# Patient Record
Sex: FEMALE | Race: OTHER | NOT HISPANIC OR LATINO | URBAN - METROPOLITAN AREA
[De-identification: names, ages, dates, MRNs, and addresses within clinical notes are randomized per-mention and may not be internally consistent; named-entity substitution may affect disease eponyms.]

---

## 2023-08-29 ENCOUNTER — EMERGENCY (EMERGENCY)
Facility: HOSPITAL | Age: 23
LOS: 1 days | Discharge: HOME | End: 2023-08-29
Admitting: EMERGENCY MEDICINE
Payer: MEDICAID

## 2023-08-29 VITALS
TEMPERATURE: 98 F | SYSTOLIC BLOOD PRESSURE: 108 MMHG | RESPIRATION RATE: 16 BRPM | DIASTOLIC BLOOD PRESSURE: 83 MMHG | OXYGEN SATURATION: 100 % | HEART RATE: 91 BPM

## 2023-08-29 LAB
BASOPHILS # BLD AUTO: 0.05 K/UL — SIGNIFICANT CHANGE UP (ref 0–0.2)
BASOPHILS NFR BLD AUTO: 0.4 % — SIGNIFICANT CHANGE UP (ref 0–2)
EOSINOPHIL # BLD AUTO: 0.47 K/UL — SIGNIFICANT CHANGE UP (ref 0–0.5)
EOSINOPHIL NFR BLD AUTO: 3.7 % — SIGNIFICANT CHANGE UP (ref 0–6)
HCT VFR BLD CALC: 34.4 % — LOW (ref 34.5–45)
HGB BLD-MCNC: 11.4 G/DL — LOW (ref 11.5–15.5)
IANC: 7.43 K/UL — HIGH (ref 1.8–7.4)
IMM GRANULOCYTES NFR BLD AUTO: 0.2 % — SIGNIFICANT CHANGE UP (ref 0–0.9)
LYMPHOCYTES # BLD AUTO: 32.4 % — SIGNIFICANT CHANGE UP (ref 13–44)
LYMPHOCYTES # BLD AUTO: 4.15 K/UL — HIGH (ref 1–3.3)
MCHC RBC-ENTMCNC: 29.5 PG — SIGNIFICANT CHANGE UP (ref 27–34)
MCHC RBC-ENTMCNC: 33.1 GM/DL — SIGNIFICANT CHANGE UP (ref 32–36)
MCV RBC AUTO: 88.9 FL — SIGNIFICANT CHANGE UP (ref 80–100)
MONOCYTES # BLD AUTO: 0.66 K/UL — SIGNIFICANT CHANGE UP (ref 0–0.9)
MONOCYTES NFR BLD AUTO: 5.2 % — SIGNIFICANT CHANGE UP (ref 2–14)
NEUTROPHILS # BLD AUTO: 7.43 K/UL — HIGH (ref 1.8–7.4)
NEUTROPHILS NFR BLD AUTO: 58.1 % — SIGNIFICANT CHANGE UP (ref 43–77)
NRBC # BLD: 0 /100 WBCS — SIGNIFICANT CHANGE UP (ref 0–0)
NRBC # FLD: 0 K/UL — SIGNIFICANT CHANGE UP (ref 0–0)
PLATELET # BLD AUTO: 328 K/UL — SIGNIFICANT CHANGE UP (ref 150–400)
RBC # BLD: 3.87 M/UL — SIGNIFICANT CHANGE UP (ref 3.8–5.2)
RBC # FLD: 12.7 % — SIGNIFICANT CHANGE UP (ref 10.3–14.5)
WBC # BLD: 12.79 K/UL — HIGH (ref 3.8–10.5)
WBC # FLD AUTO: 12.79 K/UL — HIGH (ref 3.8–10.5)

## 2023-08-29 PROCEDURE — 99284 EMERGENCY DEPT VISIT MOD MDM: CPT

## 2023-08-29 PROCEDURE — 76830 TRANSVAGINAL US NON-OB: CPT | Mod: 26

## 2023-08-29 NOTE — ED ADULT TRIAGE NOTE - CHIEF COMPLAINT QUOTE
Pt c/o increase vaginal bleeding x2 months s/p taking plan B pill on July 9th. Patient also took another plan B pill on July 23rd. Now endorsing another heavy period. Endorsing fatigue. Pt denies blood clots, chest pain, sob, dizziness, n/v, fevers and chills. Pt well appearing.

## 2023-08-29 NOTE — ED PROVIDER NOTE - CROS ED GU ALL NEG
Assessment  Septic Shock from intra abdominal cause, wit persistent lactic acidosis Mesenteric Ischemia possible and CT showing duodenal perforation with GNR Pseudomonas Bacteremia     with multi organ Dysfunction  Upper GI bleed suspected s/p Code Fusion in ED, but no gross bleeding noted doubt as cause of shock  Lactic acidosis    Leukopenia    Thrombocytopenia     Transaminitis   MAYELIN suspect normal baseline   Underlying Afib on Eliquis  Underlying bipolar disorder, h/o skin cancer, 
- - -

## 2023-08-29 NOTE — ED PROVIDER NOTE - PROGRESS NOTE DETAILS
MICHAEL Hobson: Pt signed out to MICHAEL Villalobos pending lab and US results. MICHAEL Villalobos: Hgb 11.4, tvus negative. Toradol given for pain here. Will dc w/ outpt gyn follow up

## 2023-08-29 NOTE — ED PROVIDER NOTE - ABDOMINAL EXAM
Ambulatory Surgery/Procedure Discharge Note    Vitals:    11/09/22 1700   BP: (!) 127/54   Pulse: 78   Resp: 16   Temp:    SpO2: 91%   Patient meets criteria for discharge per Adeola score    In: 180 [P.O.:180]  Out: 50     Restroom use offered before discharge. Yes    Pain assessment:  present - adequately treated  Pain Level: 3    Pt and family states \"ready to go home\". Pt alert and oriented x4. IV removed. Denies N/V or pain. Left leg ACE bandage dressing-C,D,I. Discharge instructions given to pt and  with pt permission. Pt and  verbalized understanding of all instructions. Left with all belongings, crutches, and discharge instructions. Patient's  picked up prescriptions from [facility] outpatient pharmacy. Patient discharged to home/self care. Patient discharged via wheel chair by transporter to waiting family.        11/9/2022 5:30 PM soft/tender...

## 2023-08-29 NOTE — ED ADULT NURSE NOTE - NSFALLUNIVINTERV_ED_ALL_ED
Bed/Stretcher in lowest position, wheels locked, appropriate side rails in place/Call bell, personal items and telephone in reach/Instruct patient to call for assistance before getting out of bed/chair/stretcher/Non-slip footwear applied when patient is off stretcher/[place] to call system/Physically safe environment - no spills, clutter or unnecessary equipment/Purposeful proactive rounding/Room/bathroom lighting operational, light cord in reach

## 2023-08-29 NOTE — ED PROVIDER NOTE - OBJECTIVE STATEMENT
23yF w/no stated pmhx presenting to the ED with abnormal vaginal bleeding and pelvic pain. Pt states on 7/09 she took Plan B and her period began that day (was due for her menses), she reports vaginal bleeding from 7/09-7/22, she then took Plan B a second time on 7/23 following by bleeding for 1 week. She then had her period 8/10-8/15 and now has been having vaginal bleeding since 8/26. pt reports associated lower pelvic pain described as achey. Pt states she saw her PMD in July and had labwork performed with reported normal results, was recommended to have a pelvic US but has not had it completed yet. pt does not see an gynecologist. She states in July she was also treated for a UTI. Denies fever/chills, nausea, vomiting, diarrhea, dysuria, urinary frequency, back pain, cp, sob, palpitations, dizziness, weakness or any other concerns.

## 2023-08-29 NOTE — ED ADULT NURSE NOTE - OBJECTIVE STATEMENT
pt received in intake rm 13. pt is AAOX4 and ambulatory. pt c/o vaginal bleeding for a few days. pt LMP was beginning of August. pt reports having normal menstrual period. pt denies CP, SOB, N/V/D, dizziness and changes in vision. pt reports having taken plan B twice. pt endorsing cramping and pelvic pain. pt RR are even and unlabored. pt has 20g IV in RAC, labs drawn and sent and pt pending US. ongoing eval in progress.

## 2023-08-29 NOTE — ED PROVIDER NOTE - PATIENT PORTAL LINK FT
You can access the FollowMyHealth Patient Portal offered by [facility] by registering at the following website: [email or web address]. By joining [employer]’s FollowMyHealth portal, you will also be able to view your health information using other applications (apps) compatible with our system.

## 2023-08-29 NOTE — ED PROVIDER NOTE - CLINICAL SUMMARY MEDICAL DECISION MAKING FREE TEXT BOX
23yF w/no stated pmhx presenting to the ED with abnormal vaginal bleeding and pelvic pain. Pt states on 7/09 she took Plan B and her period began that day (was due for her menses), she reports vaginal bleeding from 7/09-7/22, she then took Plan B a second time on 7/23 following by bleeding for 1 week. She then had her period 8/10-8/15 and now has been having vaginal bleeding since 8/26. pt reports associated lower pelvic pain described as achey. Pt states she saw her PMD in July and had labwork performed with reported normal results, was recommended to have a pelvic US but has not had it completed yet. pt does not see an gynecologist. She states in July she was also treated for a UTI. On exam pt is well appearing, vitals within normal, mild suprapubic tenderness, no CMT, no adnexal tenderness. Plan: cbc/cmp, ua/ucx, ucg, TVUS to r/o hemorrhagic cyst/fibroid (pt does not have a gyn to follow up with).

## 2023-08-29 NOTE — ED PROVIDER NOTE - NSFOLLOWUPINSTRUCTIONS_ED_ALL_ED_FT
Follow up with a GYN within 1 week, referral list attached please call to make an appointment  Take Ibuprofen 600mg every 6-8 hours as needed for pain or cramping, take with food  -You can also take Tylenol 650mg every 6 hours for pain  Return to the ER with any worsening or concerning symptoms, increased pain, weakness, feeling faint, vomiting or any other concerns. Follow up with a GYN within 1 week, call [phone] to make an appointment with our clinic.   Take Ibuprofen 600mg every 6-8 hours as needed for pain or cramping, take with food  -You can also take Tylenol 650mg every 6 hours for pain  Return to the ER with any worsening or concerning symptoms, increased pain, weakness, feeling faint, vomiting or any other concerns.

## 2023-08-30 LAB
ALBUMIN SERPL ELPH-MCNC: 4.5 G/DL — SIGNIFICANT CHANGE UP (ref 3.3–5)
ALP SERPL-CCNC: 47 U/L — SIGNIFICANT CHANGE UP (ref 40–120)
ALT FLD-CCNC: 16 U/L — SIGNIFICANT CHANGE UP (ref 4–33)
ANION GAP SERPL CALC-SCNC: 15 MMOL/L — HIGH (ref 7–14)
APPEARANCE UR: CLEAR — SIGNIFICANT CHANGE UP
AST SERPL-CCNC: 24 U/L — SIGNIFICANT CHANGE UP (ref 4–32)
BILIRUB SERPL-MCNC: <0.2 MG/DL — SIGNIFICANT CHANGE UP (ref 0.2–1.2)
BILIRUB UR-MCNC: NEGATIVE — SIGNIFICANT CHANGE UP
BUN SERPL-MCNC: 15 MG/DL — SIGNIFICANT CHANGE UP (ref 7–23)
CALCIUM SERPL-MCNC: 9.1 MG/DL — SIGNIFICANT CHANGE UP (ref 8.4–10.5)
CHLORIDE SERPL-SCNC: 102 MMOL/L — SIGNIFICANT CHANGE UP (ref 98–107)
CO2 SERPL-SCNC: 24 MMOL/L — SIGNIFICANT CHANGE UP (ref 22–31)
COLOR SPEC: YELLOW — SIGNIFICANT CHANGE UP
CREAT SERPL-MCNC: 0.65 MG/DL — SIGNIFICANT CHANGE UP (ref 0.5–1.3)
DIFF PNL FLD: ABNORMAL
EGFR: 128 ML/MIN/1.73M2 — SIGNIFICANT CHANGE UP
GLUCOSE SERPL-MCNC: 82 MG/DL — SIGNIFICANT CHANGE UP (ref 70–99)
GLUCOSE UR QL: NEGATIVE MG/DL — SIGNIFICANT CHANGE UP
KETONES UR-MCNC: NEGATIVE MG/DL — SIGNIFICANT CHANGE UP
LEUKOCYTE ESTERASE UR-ACNC: NEGATIVE — SIGNIFICANT CHANGE UP
NITRITE UR-MCNC: NEGATIVE — SIGNIFICANT CHANGE UP
PH UR: 6.5 — SIGNIFICANT CHANGE UP (ref 5–8)
POTASSIUM SERPL-MCNC: 3.9 MMOL/L — SIGNIFICANT CHANGE UP (ref 3.5–5.3)
POTASSIUM SERPL-SCNC: 3.9 MMOL/L — SIGNIFICANT CHANGE UP (ref 3.5–5.3)
PROT SERPL-MCNC: 7.9 G/DL — SIGNIFICANT CHANGE UP (ref 6–8.3)
PROT UR-MCNC: NEGATIVE MG/DL — SIGNIFICANT CHANGE UP
SODIUM SERPL-SCNC: 141 MMOL/L — SIGNIFICANT CHANGE UP (ref 135–145)
SP GR SPEC: 1.01 — SIGNIFICANT CHANGE UP (ref 1–1.03)
UROBILINOGEN FLD QL: 1 MG/DL — SIGNIFICANT CHANGE UP (ref 0.2–1)

## 2023-08-30 RX ORDER — KETOROLAC TROMETHAMINE 30 MG/ML
15 SYRINGE (ML) INJECTION ONCE
Refills: 0 | Status: DISCONTINUED | OUTPATIENT
Start: 2023-08-30 | End: 2023-08-30

## 2023-08-30 RX ADMIN — Medication 15 MILLIGRAM(S): at 01:30

## 2023-08-31 LAB
CULTURE RESULTS: SIGNIFICANT CHANGE UP
SPECIMEN SOURCE: SIGNIFICANT CHANGE UP

## 2024-03-20 ENCOUNTER — EMERGENCY (EMERGENCY)
Facility: HOSPITAL | Age: 24
LOS: 1 days | Discharge: LEFT BEFORE DISCHARGE | End: 2024-03-20
Admitting: EMERGENCY MEDICINE
Payer: MEDICAID

## 2024-03-20 VITALS
OXYGEN SATURATION: 100 % | HEART RATE: 97 BPM | RESPIRATION RATE: 18 BRPM | TEMPERATURE: 98 F | SYSTOLIC BLOOD PRESSURE: 97 MMHG | DIASTOLIC BLOOD PRESSURE: 58 MMHG

## 2024-03-20 PROCEDURE — L9992: CPT

## 2024-03-20 NOTE — ED ADULT TRIAGE NOTE - CHIEF COMPLAINT QUOTE
Pt. c/o blood when wiping herself but unsure where it was coming from. States she did have a bowel movement at the time. Pt. not currently on her period but scheduled to get it in the next 4-5 days. Also c/o headache, abdominal cramps and chills.

## 2024-03-21 PROBLEM — Z78.9 OTHER SPECIFIED HEALTH STATUS: Chronic | Status: ACTIVE | Noted: 2023-08-29

## 2024-03-23 ENCOUNTER — EMERGENCY (EMERGENCY)
Facility: HOSPITAL | Age: 24
LOS: 1 days | Discharge: HOME | End: 2024-03-23
Attending: EMERGENCY MEDICINE
Payer: MEDICAID

## 2024-03-23 VITALS
SYSTOLIC BLOOD PRESSURE: 80 MMHG | RESPIRATION RATE: 22 BRPM | WEIGHT: 111.99 LBS | HEART RATE: 132 BPM | DIASTOLIC BLOOD PRESSURE: 55 MMHG | TEMPERATURE: 98 F | OXYGEN SATURATION: 99 %

## 2024-03-23 LAB
ALBUMIN SERPL ELPH-MCNC: 3.8 G/DL — SIGNIFICANT CHANGE UP (ref 3.5–5)
ALP SERPL-CCNC: 50 U/L — SIGNIFICANT CHANGE UP (ref 40–120)
ALT FLD-CCNC: 50 U/L DA — SIGNIFICANT CHANGE UP (ref 10–60)
ANION GAP SERPL CALC-SCNC: 9 MMOL/L — SIGNIFICANT CHANGE UP (ref 5–17)
APPEARANCE UR: CLEAR — SIGNIFICANT CHANGE UP
APTT BLD: 35.2 SEC — SIGNIFICANT CHANGE UP (ref 24.5–35.6)
AST SERPL-CCNC: 26 U/L — SIGNIFICANT CHANGE UP (ref 10–40)
BASOPHILS # BLD AUTO: 0.02 K/UL — SIGNIFICANT CHANGE UP (ref 0–0.2)
BASOPHILS NFR BLD AUTO: 0.2 % — SIGNIFICANT CHANGE UP (ref 0–2)
BILIRUB SERPL-MCNC: 0.3 MG/DL — SIGNIFICANT CHANGE UP (ref 0.2–1.2)
BILIRUB UR-MCNC: NEGATIVE — SIGNIFICANT CHANGE UP
BUN SERPL-MCNC: 16 MG/DL — SIGNIFICANT CHANGE UP (ref 7–18)
CALCIUM SERPL-MCNC: 9.1 MG/DL — SIGNIFICANT CHANGE UP (ref 8.4–10.5)
CHLORIDE SERPL-SCNC: 104 MMOL/L — SIGNIFICANT CHANGE UP (ref 96–108)
CO2 SERPL-SCNC: 22 MMOL/L — SIGNIFICANT CHANGE UP (ref 22–31)
COLOR SPEC: YELLOW — SIGNIFICANT CHANGE UP
CREAT SERPL-MCNC: 0.66 MG/DL — SIGNIFICANT CHANGE UP (ref 0.5–1.3)
DIFF PNL FLD: NEGATIVE — SIGNIFICANT CHANGE UP
EGFR: 128 ML/MIN/1.73M2 — SIGNIFICANT CHANGE UP
EOSINOPHIL # BLD AUTO: 0.05 K/UL — SIGNIFICANT CHANGE UP (ref 0–0.5)
EOSINOPHIL NFR BLD AUTO: 0.6 % — SIGNIFICANT CHANGE UP (ref 0–6)
FLUAV AG NPH QL: SIGNIFICANT CHANGE UP
FLUBV AG NPH QL: SIGNIFICANT CHANGE UP
GLUCOSE SERPL-MCNC: 103 MG/DL — HIGH (ref 70–99)
GLUCOSE UR QL: NEGATIVE MG/DL — SIGNIFICANT CHANGE UP
HCT VFR BLD CALC: 34 % — LOW (ref 34.5–45)
HGB BLD-MCNC: 11.5 G/DL — SIGNIFICANT CHANGE UP (ref 11.5–15.5)
IMM GRANULOCYTES NFR BLD AUTO: 0.4 % — SIGNIFICANT CHANGE UP (ref 0–0.9)
INR BLD: 1.04 RATIO — SIGNIFICANT CHANGE UP (ref 0.85–1.18)
KETONES UR-MCNC: NEGATIVE MG/DL — SIGNIFICANT CHANGE UP
LACTATE SERPL-SCNC: 1.1 MMOL/L — SIGNIFICANT CHANGE UP (ref 0.7–2)
LEUKOCYTE ESTERASE UR-ACNC: NEGATIVE — SIGNIFICANT CHANGE UP
LYMPHOCYTES # BLD AUTO: 0.62 K/UL — LOW (ref 1–3.3)
LYMPHOCYTES # BLD AUTO: 7.5 % — LOW (ref 13–44)
MAGNESIUM SERPL-MCNC: 1.7 MG/DL — SIGNIFICANT CHANGE UP (ref 1.6–2.6)
MCHC RBC-ENTMCNC: 28.6 PG — SIGNIFICANT CHANGE UP (ref 27–34)
MCHC RBC-ENTMCNC: 33.8 GM/DL — SIGNIFICANT CHANGE UP (ref 32–36)
MCV RBC AUTO: 84.6 FL — SIGNIFICANT CHANGE UP (ref 80–100)
MONOCYTES # BLD AUTO: 0.14 K/UL — SIGNIFICANT CHANGE UP (ref 0–0.9)
MONOCYTES NFR BLD AUTO: 1.7 % — LOW (ref 2–14)
NEUTROPHILS # BLD AUTO: 7.36 K/UL — SIGNIFICANT CHANGE UP (ref 1.8–7.4)
NEUTROPHILS NFR BLD AUTO: 89.6 % — HIGH (ref 43–77)
NITRITE UR-MCNC: NEGATIVE — SIGNIFICANT CHANGE UP
NRBC # BLD: 0 /100 WBCS — SIGNIFICANT CHANGE UP (ref 0–0)
PH UR: 5.5 — SIGNIFICANT CHANGE UP (ref 5–8)
PLATELET # BLD AUTO: 265 K/UL — SIGNIFICANT CHANGE UP (ref 150–400)
POTASSIUM SERPL-MCNC: 3.6 MMOL/L — SIGNIFICANT CHANGE UP (ref 3.5–5.3)
POTASSIUM SERPL-SCNC: 3.6 MMOL/L — SIGNIFICANT CHANGE UP (ref 3.5–5.3)
PROT SERPL-MCNC: 8.1 G/DL — SIGNIFICANT CHANGE UP (ref 6–8.3)
PROT UR-MCNC: NEGATIVE MG/DL — SIGNIFICANT CHANGE UP
PROTHROM AB SERPL-ACNC: 11.8 SEC — SIGNIFICANT CHANGE UP (ref 9.5–13)
RBC # BLD: 4.02 M/UL — SIGNIFICANT CHANGE UP (ref 3.8–5.2)
RBC # FLD: 13.2 % — SIGNIFICANT CHANGE UP (ref 10.3–14.5)
SARS-COV-2 RNA SPEC QL NAA+PROBE: SIGNIFICANT CHANGE UP
SODIUM SERPL-SCNC: 135 MMOL/L — SIGNIFICANT CHANGE UP (ref 135–145)
SP GR SPEC: 1 — SIGNIFICANT CHANGE UP (ref 1–1.03)
UROBILINOGEN FLD QL: 0.2 MG/DL — SIGNIFICANT CHANGE UP (ref 0.2–1)
WBC # BLD: 8.22 K/UL — SIGNIFICANT CHANGE UP (ref 3.8–10.5)
WBC # FLD AUTO: 8.22 K/UL — SIGNIFICANT CHANGE UP (ref 3.8–10.5)

## 2024-03-23 PROCEDURE — 96374 THER/PROPH/DIAG INJ IV PUSH: CPT

## 2024-03-23 PROCEDURE — 85730 THROMBOPLASTIN TIME PARTIAL: CPT

## 2024-03-23 PROCEDURE — 99285 EMERGENCY DEPT VISIT HI MDM: CPT | Mod: 25

## 2024-03-23 PROCEDURE — 83605 ASSAY OF LACTIC ACID: CPT

## 2024-03-23 PROCEDURE — 85025 COMPLETE CBC W/AUTO DIFF WBC: CPT

## 2024-03-23 PROCEDURE — 87637 SARSCOV2&INF A&B&RSV AMP PRB: CPT

## 2024-03-23 PROCEDURE — 83735 ASSAY OF MAGNESIUM: CPT

## 2024-03-23 PROCEDURE — 71046 X-RAY EXAM CHEST 2 VIEWS: CPT

## 2024-03-23 PROCEDURE — 93005 ELECTROCARDIOGRAM TRACING: CPT

## 2024-03-23 PROCEDURE — 99291 CRITICAL CARE FIRST HOUR: CPT

## 2024-03-23 PROCEDURE — 71046 X-RAY EXAM CHEST 2 VIEWS: CPT | Mod: 26,59

## 2024-03-23 PROCEDURE — 36415 COLL VENOUS BLD VENIPUNCTURE: CPT

## 2024-03-23 PROCEDURE — 81003 URINALYSIS AUTO W/O SCOPE: CPT

## 2024-03-23 PROCEDURE — 85610 PROTHROMBIN TIME: CPT

## 2024-03-23 PROCEDURE — 87040 BLOOD CULTURE FOR BACTERIA: CPT

## 2024-03-23 PROCEDURE — 80053 COMPREHEN METABOLIC PANEL: CPT

## 2024-03-23 RX ORDER — KETOROLAC TROMETHAMINE 30 MG/ML
15 SYRINGE (ML) INJECTION ONCE
Refills: 0 | Status: DISCONTINUED | OUTPATIENT
Start: 2024-03-23 | End: 2024-03-23

## 2024-03-23 RX ORDER — ACETAMINOPHEN 500 MG
650 TABLET ORAL ONCE
Refills: 0 | Status: COMPLETED | OUTPATIENT
Start: 2024-03-23 | End: 2024-03-23

## 2024-03-23 RX ORDER — SODIUM CHLORIDE 9 MG/ML
1550 INJECTION INTRAMUSCULAR; INTRAVENOUS; SUBCUTANEOUS ONCE
Refills: 0 | Status: COMPLETED | OUTPATIENT
Start: 2024-03-23 | End: 2024-03-23

## 2024-03-23 RX ADMIN — Medication 650 MILLIGRAM(S): at 20:39

## 2024-03-23 RX ADMIN — Medication 650 MILLIGRAM(S): at 19:39

## 2024-03-23 RX ADMIN — SODIUM CHLORIDE 1550 MILLILITER(S): 9 INJECTION INTRAMUSCULAR; INTRAVENOUS; SUBCUTANEOUS at 20:39

## 2024-03-23 RX ADMIN — SODIUM CHLORIDE 1550 MILLILITER(S): 9 INJECTION INTRAMUSCULAR; INTRAVENOUS; SUBCUTANEOUS at 19:39

## 2024-03-23 RX ADMIN — Medication 15 MILLIGRAM(S): at 23:03

## 2024-03-23 NOTE — ED PROVIDER NOTE - CLINICAL SUMMARY MEDICAL DECISION MAKING FREE TEXT BOX
23-year-old female with history of hypothyroidism, alopecia areata, complaining of blood in stool 1 time on Wednesday and 1 time today, subjective fever with chills.  Patient with anal fissure, blood in stool mostly due to anal fissure.  Patient also with sepsis mostly viral origin, will do sepsis workup, give IV fluid, Tylenol and reassess

## 2024-03-23 NOTE — ED PROVIDER NOTE - NSFOLLOWUPINSTRUCTIONS_ED_ALL_ED_FT
Anal Fissure    WHAT YOU NEED TO KNOW:    What is an anal fissure? An anal fissure is a cut or tear in the tissue inside your anus. An anal fissure may be acute or chronic. An acute anal fissure is usually small and shallow and often heals without treatment. A chronic fissure may last longer than a month and will usually require treatment. A chronic anal fissure comes back after treatment.    What causes an anal fissure? Anal fissures may occur when your anal muscle becomes too tight. Your anal muscle forms a ring around your anus and helps control your bowel movements. When this muscle becomes too tight, there is decreased blood flow to your anus. You may also have too much pressure around your anus. Other possible causes may include any of the following:    Bowel problems: Constipation may cause you to strain, which may cause an anal fissure. Certain bowel diseases may also cause anal fissures, including Crohn disease and inflammatory bowel disease.    Cancer: Cancer in your anus may cause your anal tissue to tear. Leukemia is another cancer that may cause you to have an anal fissure. Treatments for cancer, such as chemotherapy, may also cause an anal fissure.    Infections: Certain infections, such as HIV, syphilis, and tuberculosis may increase your risk for an anal fissure.    Trauma: The area around your anus may tear when you give birth. Anal trauma may also be caused by anal intercourse.  What are the signs and symptoms of an anal fissure?    Pain that lasts several hours around your anus after you have a bowel movement    Bleeding from your anus    Bright red blood in your bowel movement or spots of blood on your toilet paper    Pain while you urinate or have sex    Spasms in your anus    Itching around your anus  How is an anal fissure diagnosed? Your healthcare provider will ask about your symptoms and when they started. Tell your provider about your bowel movements, foods you eat, and medicines you take. Your provider may also ask if you have other medical conditions, or had anal procedures or surgeries. Your provider will look at and feel your anus to check for cuts or tears. If you are in severe pain, you may get local anesthesia. Your provider may also remove a piece of anal tissue and send it to a lab for testing.    How is an anal fissure treated? You may also need to have the cause of your anal fissure treated. You may need any of the following to treat your anal fissure:    Home care:  Sitz bath: You may need to soak in a warm tub or take a sitz bath. A sitz bath may decrease your pain and relax your anal muscle. You may need to do this more than once a day. Ask your healthcare provider for information on how to use a sitz bath and how often you should bathe.    Nutrition: Eat foods that are high in fiber. This will help keep your bowel movements soft. High-fiber foods include fruits, vegetables, and whole grains.    Drink more liquids: Liquids may help soften your bowel movements. This will help prevent you from straining. Ask your healthcare provider how much liquid you should drink each day.    Medicine:  Stool softeners: Your healthcare provider may also give you medicine that makes your bowel movements softer. This helps prevent constipation. You will be less likely to strain and cause an anal fissure if you are not constipated.    Prescription pain medicine may be given. Ask your healthcare provider how to take this medicine safely. Some prescription pain medicines contain acetaminophen. Do not take other medicines that contain acetaminophen without talking to your healthcare provider. Too much acetaminophen may cause liver damage. Prescription pain medicine may cause constipation. Ask your healthcare provider how to prevent or treat constipation.    Topical medicine: Topical medicine may be put just inside your anus. The medicine may help your anal muscle relax and increase blood flow to your anus. This medicine may contain anesthesia to help decrease your pain. Your healthcare provider will teach you the right way to use topical medicine.    Botulinum toxin: This is medicine given as a shot into the skin around your anus. It helps your anal muscle relax. Ask your healthcare provider for more information about botulinum toxin.    Surgery: You may need surgery if other treatments do not work. You may also need surgery if your anal fissure is very painful. Surgery is often used for chronic anal fissures. The most common surgery is called a lateral internal sphincterotomy. A small part of your anal muscle is cut to help relax your anal muscle and decrease your pain. Your healthcare provider may remove all or some of your anal fissure. This is called a fissurectomy.  What are the risks of an anal fissure?    Topical medicine for your anal fissure may give you a headache or make you feel lightheaded. Your skin may become red and you may also have a burning feeling on your anus. The botulinum toxin shot may hurt and may cause muscle weakness. It may also cause a painful infection of the tissue covering your anal muscles. You may also be less able to feel the area in and around your anus. Botulinum toxin or surgery may make you unable to control your bowel movements or passing gas. This is called incontinence. Surgery may also cause bleeding, an infection, or injury to your anal tissue. Even with treatment, your anal fissure may occur again.    Without treatment, your anal fissure may deepen or become infected. You may develop an abnormal opening from your anus to nearby organs. Scar tissue may form in your anus and cause it to become narrow. Without treatment, you may have worse pain during bowel movements.  When should I contact my healthcare provider?    You are unable to have a bowel movement.    You have spasms in your anus that do not stop.  When should I seek immediate care or call 911?    You have very bad pain in or around your anus.    You have bleeding from your anus that does not stop.  CARE AGREEMENT:    You have the right to help plan your care. Learn about your health condition and how it may be treated. Discuss treatment options with your healthcare providers to decide what care you want to receive. You always have the right to refuse treatment.       sitz bath 1-2 times a day   drink plenty of fluids   take Metamucil 1 tablespoon in a glass of water 2 times a day   follow-up with your medical doctor   take Tylenol or ibuprofen 200 mg 2 tablets 3 to 4 times a day as needed for fever

## 2024-03-23 NOTE — ED PROVIDER NOTE - PATIENT PORTAL LINK FT
You can access the FollowMyHealth Patient Portal offered by [facility] by registering at the following website: [email or web address]. By joining [employer]’s FollowMyHealth portal, you will also be able to view your health information using other applications (apps) compatible with our system.

## 2024-03-23 NOTE — ED PROVIDER NOTE - CARE PLAN
1 Principal Discharge DX:	Sepsis  Secondary Diagnosis:	Rectal bleed  Secondary Diagnosis:	Anal fissure

## 2024-03-23 NOTE — ED PROVIDER NOTE - PROGRESS NOTE DETAILS
Labs explained to pt  Pt with no leukocytosis, no source of infection.  Pt probably with viral infection. CXR negative   patient complaining of having a frontal headache, will give Toradol, will also feed patient, and D/C home.   patient claims her blood pressure is always low Patient ate and tolerated well, will DC home, she denies any further headaches

## 2024-03-23 NOTE — ED ADULT NURSE NOTE - OBJECTIVE STATEMENT
Pt present to the Ed with c/o rectal bleed on defecation , fever and dizziness since Wednesday onwards. Breathing spontaneously on RA. safety maintained

## 2024-03-23 NOTE — ED PROVIDER NOTE - NS ED ATTENDING STATEMENT MOD
I have personally provided the amount of critical care time documented below excluding time spent on separate procedures.
Alert and oriented to person, place and time

## 2024-03-23 NOTE — ED PROVIDER NOTE - OBJECTIVE STATEMENT
23-year-old female with history of hypothyroidism, alopecia areata, LMP 2/26.  Patient complaining of blood in her soft stool 1 time on Wednesday and 1 time today associate with nauseousness.  She denies straining, abdominal pain, vomiting. patient claims on Wednesday and today, she noticed with subjective fever, chills.  Patient denies dysuria, recent traveling, admits to having good appetite.  Last dose Tylenol was 4 PM

## 2024-03-24 VITALS
SYSTOLIC BLOOD PRESSURE: 99 MMHG | RESPIRATION RATE: 17 BRPM | DIASTOLIC BLOOD PRESSURE: 67 MMHG | HEART RATE: 78 BPM | OXYGEN SATURATION: 99 % | TEMPERATURE: 98 F

## 2024-03-29 LAB
CULTURE RESULTS: SIGNIFICANT CHANGE UP
CULTURE RESULTS: SIGNIFICANT CHANGE UP
SPECIMEN SOURCE: SIGNIFICANT CHANGE UP
SPECIMEN SOURCE: SIGNIFICANT CHANGE UP

## 2025-05-01 PROBLEM — Z00.00 ENCOUNTER FOR PREVENTIVE HEALTH EXAMINATION: Status: ACTIVE | Noted: 2025-05-01

## 2025-05-02 ENCOUNTER — APPOINTMENT (OUTPATIENT)
Dept: OBGYN | Facility: CLINIC | Age: 25
End: 2025-05-02
Payer: MEDICAID

## 2025-05-02 ENCOUNTER — NON-APPOINTMENT (OUTPATIENT)
Age: 25
End: 2025-05-02

## 2025-05-02 VITALS — DIASTOLIC BLOOD PRESSURE: 61 MMHG | WEIGHT: 108.8 LBS | SYSTOLIC BLOOD PRESSURE: 96 MMHG

## 2025-05-02 DIAGNOSIS — E03.9 HYPOTHYROIDISM, UNSPECIFIED: ICD-10-CM

## 2025-05-02 DIAGNOSIS — Z64.0 PROBLEMS RELATED TO UNWANTED PREGNANCY: ICD-10-CM

## 2025-05-02 DIAGNOSIS — Z11.3 ENCOUNTER FOR SCREENING FOR INFECTIONS WITH A PREDOMINANTLY SEXUAL MODE OF TRANSMISSION: ICD-10-CM

## 2025-05-02 PROCEDURE — S0190: CPT

## 2025-05-02 PROCEDURE — 99204 OFFICE O/P NEW MOD 45 MIN: CPT | Mod: TH,25

## 2025-05-02 PROCEDURE — 76830 TRANSVAGINAL US NON-OB: CPT

## 2025-05-02 PROCEDURE — 99459 PELVIC EXAMINATION: CPT

## 2025-05-02 RX ORDER — MIFEPRISTONE 200 MG/1
200 TABLET ORAL
Refills: 0 | Status: COMPLETED | OUTPATIENT
Start: 2025-05-02

## 2025-05-02 RX ORDER — OXYCODONE AND ACETAMINOPHEN 5; 325 MG/1; MG/1
5-325 TABLET ORAL
Qty: 6 | Refills: 0 | Status: ACTIVE | COMMUNITY
Start: 2025-05-02 | End: 1900-01-01

## 2025-05-02 RX ORDER — MISOPROSTOL 200 UG/1
200 TABLET ORAL
Qty: 8 | Refills: 0 | Status: ACTIVE | COMMUNITY
Start: 2025-05-02 | End: 1900-01-01

## 2025-05-02 RX ORDER — ONDANSETRON 4 MG/1
4 TABLET, ORALLY DISINTEGRATING ORAL 3 TIMES DAILY
Qty: 12 | Refills: 0 | Status: ACTIVE | COMMUNITY
Start: 2025-05-02 | End: 1900-01-01

## 2025-05-02 RX ORDER — LEVOTHYROXINE SODIUM 0.03 MG/1
25 TABLET ORAL
Refills: 0 | Status: ACTIVE | COMMUNITY

## 2025-05-02 RX ORDER — IBUPROFEN 600 MG/1
600 TABLET, FILM COATED ORAL
Qty: 6 | Refills: 1 | Status: ACTIVE | COMMUNITY
Start: 2025-05-02 | End: 1900-01-01

## 2025-05-02 RX ADMIN — MIFEPRISTONE 1 MG: 200 TABLET ORAL at 00:00

## 2025-05-05 LAB
C TRACH RRNA SPEC QL NAA+PROBE: NOT DETECTED
N GONORRHOEA RRNA SPEC QL NAA+PROBE: NOT DETECTED
SOURCE AMPLIFICATION: NORMAL

## 2025-05-07 ENCOUNTER — NON-APPOINTMENT (OUTPATIENT)
Age: 25
End: 2025-05-07

## 2025-05-09 ENCOUNTER — APPOINTMENT (OUTPATIENT)
Dept: OBGYN | Facility: CLINIC | Age: 25
End: 2025-05-09
Payer: MEDICAID

## 2025-05-09 DIAGNOSIS — Z33.2 ENCOUNTER FOR ELECTIVE TERMINATION OF PREGNANCY: ICD-10-CM

## 2025-05-09 PROCEDURE — 76830 TRANSVAGINAL US NON-OB: CPT | Mod: 26,59

## 2025-05-09 PROCEDURE — 99214 OFFICE O/P EST MOD 30 MIN: CPT | Mod: TH,25

## 2025-05-09 PROCEDURE — 99459 PELVIC EXAMINATION: CPT

## 2025-05-15 ENCOUNTER — APPOINTMENT (OUTPATIENT)
Dept: OBGYN | Facility: CLINIC | Age: 25
End: 2025-05-15

## 2025-05-30 ENCOUNTER — TRANSCRIPTION ENCOUNTER (OUTPATIENT)
Age: 25
End: 2025-05-30